# Patient Record
Sex: MALE
[De-identification: names, ages, dates, MRNs, and addresses within clinical notes are randomized per-mention and may not be internally consistent; named-entity substitution may affect disease eponyms.]

---

## 2021-07-09 ENCOUNTER — APPOINTMENT (OUTPATIENT)
Dept: UROLOGY | Facility: CLINIC | Age: 26
End: 2021-07-09
Payer: COMMERCIAL

## 2021-07-09 DIAGNOSIS — Z78.9 OTHER SPECIFIED HEALTH STATUS: ICD-10-CM

## 2021-07-09 DIAGNOSIS — N50.819 TESTICULAR PAIN, UNSPECIFIED: ICD-10-CM

## 2021-07-09 PROBLEM — Z00.00 ENCOUNTER FOR PREVENTIVE HEALTH EXAMINATION: Status: ACTIVE | Noted: 2021-07-09

## 2021-07-09 PROCEDURE — 99203 OFFICE O/P NEW LOW 30 MIN: CPT

## 2021-07-12 PROBLEM — Z78.9 NON-SMOKER: Status: ACTIVE | Noted: 2021-07-12

## 2021-07-12 PROBLEM — Z78.9 PATIENT DENIES MEDICAL PROBLEMS: Status: RESOLVED | Noted: 2021-07-12 | Resolved: 2021-07-12

## 2021-07-12 NOTE — HISTORY OF PRESENT ILLNESS
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 Was wearing cup 7/4/21. Had pain and swelling same day.\par he has had 2 ULS, as recently as yesterday noting no evidence of rupture and an intra testis hematoma. \par he feels less pain and some reduction in size.\par no prior trauma - no fertility history. \par \par reviewed the latest ULS on his phone - note the hematoma and no rupture or fluid outside testis.

## 2021-07-12 NOTE — ASSESSMENT
[FreeTextEntry1] : should resolve with conservative management - he asked about having surgery but see no indication.\par recommend better support and Aleve/ice.

## 2021-07-12 NOTE — PHYSICAL EXAM
[General Appearance - Well Developed] : well developed [General Appearance - Well Nourished] : well nourished [Edema] : no peripheral edema [Exaggerated Use Of Accessory Muscles For Inspiration] : no accessory muscle use [Abdomen Soft] : soft [Abdomen Tenderness] : non-tender [Abdomen Mass (___ Cm)] : no abdominal mass palpated [Abdomen Hernia] : no hernia was discovered [Penis Abnormality] : normal circumcised penis [Urinary Bladder Findings] : the bladder was normal on palpation [Scrotum] : the scrotum was normal [Epididymis] : the epididymides were normal [Normal Station and Gait] : the gait and station were normal for the patient's age [] : no rash [No Focal Deficits] : no focal deficits [Oriented To Time, Place, And Person] : oriented to person, place, and time [FreeTextEntry1] : the right testis enlarged but no hydrocele, bruising or edema - testis slightly tender.

## 2021-08-11 ENCOUNTER — APPOINTMENT (OUTPATIENT)
Dept: UROLOGY | Facility: CLINIC | Age: 26
End: 2021-08-11
Payer: OTHER MISCELLANEOUS

## 2021-08-11 ENCOUNTER — OUTPATIENT (OUTPATIENT)
Dept: OUTPATIENT SERVICES | Facility: HOSPITAL | Age: 26
LOS: 1 days | End: 2021-08-11
Payer: COMMERCIAL

## 2021-08-11 DIAGNOSIS — R35.0 FREQUENCY OF MICTURITION: ICD-10-CM

## 2021-08-11 DIAGNOSIS — N50.1 VASCULAR DISORDERS OF MALE GENITAL ORGANS: ICD-10-CM

## 2021-08-11 PROCEDURE — 99213 OFFICE O/P EST LOW 20 MIN: CPT

## 2021-08-11 PROCEDURE — 76870 US EXAM SCROTUM: CPT | Mod: 26

## 2021-08-11 PROCEDURE — 76870 US EXAM SCROTUM: CPT

## 2021-08-11 NOTE — HISTORY OF PRESENT ILLNESS
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 Was wearing cup 7/4/21. Had pain and swelling same day.\par he has had 2 ULS, as recently as yesterday noting no evidence of rupture and an intra testis hematoma. \par he feels less pain and some reduction in size.\par no prior trauma - no fertility history. \par \par reviewed the latest ULS on his phone - note the hematoma and no rupture or fluid outside testis. \par \par 8/21 - feels better with no pain and testis feels smaller and softer

## 2021-08-11 NOTE — ASSESSMENT
[FreeTextEntry1] : ULS noted hematoma smaller and liquifying.\par two issues we discussed: safety of returning to playing lacrosse: i say no until fully healed as would be concerned of a reinjury at this point would be devastating.\par also about atrophy and function of remaining testis - too early to sat but reassured one healthy testis sufficient for fertility and testosterone

## 2021-08-19 ENCOUNTER — NON-APPOINTMENT (OUTPATIENT)
Age: 26
End: 2021-08-19

## 2021-08-20 ENCOUNTER — NON-APPOINTMENT (OUTPATIENT)
Age: 26
End: 2021-08-20

## 2021-08-31 ENCOUNTER — OUTPATIENT (OUTPATIENT)
Dept: OUTPATIENT SERVICES | Facility: HOSPITAL | Age: 26
LOS: 1 days | End: 2021-08-31
Payer: COMMERCIAL

## 2021-08-31 ENCOUNTER — APPOINTMENT (OUTPATIENT)
Dept: UROLOGY | Facility: CLINIC | Age: 26
End: 2021-08-31

## 2021-08-31 ENCOUNTER — APPOINTMENT (OUTPATIENT)
Dept: UROLOGY | Facility: CLINIC | Age: 26
End: 2021-08-31
Payer: OTHER MISCELLANEOUS

## 2021-08-31 DIAGNOSIS — N50.1 VASCULAR DISORDERS OF MALE GENITAL ORGANS: ICD-10-CM

## 2021-08-31 DIAGNOSIS — R35.0 FREQUENCY OF MICTURITION: ICD-10-CM

## 2021-08-31 PROCEDURE — 99213 OFFICE O/P EST LOW 20 MIN: CPT

## 2021-08-31 PROCEDURE — 76870 US EXAM SCROTUM: CPT

## 2021-08-31 PROCEDURE — 76870 US EXAM SCROTUM: CPT | Mod: 26

## 2021-09-01 PROBLEM — N50.1: Status: ACTIVE | Noted: 2021-07-12

## 2021-09-01 NOTE — PHYSICAL EXAM
[General Appearance - Well Developed] : well developed [General Appearance - Well Nourished] : well nourished [Abdomen Soft] : soft [Abdomen Tenderness] : non-tender [Abdomen Hernia] : no hernia was discovered [Abdomen Mass (___ Cm)] : no abdominal mass palpated [Urethral Meatus] : meatus normal [Penis Abnormality] : normal circumcised penis [Scrotum] : the scrotum was normal [Epididymis] : the epididymides were normal [Testes Tenderness] : no tenderness of the testes [Testes Mass (___cm)] : there were no testicular masses [FreeTextEntry1] : can feel slight enlargement bottom of left testis which slightly more firm [] : no respiratory distress [Edema] : no peripheral edema [Oriented To Time, Place, And Person] : oriented to person, place, and time [Normal Station and Gait] : the gait and station were normal for the patient's age [No Focal Deficits] : no focal deficits

## 2021-09-01 NOTE — ASSESSMENT
[FreeTextEntry1] : anxious to return though will wear better protection \par Ok to return to playing without restriction \par plan to reimage 3 months

## 2021-09-01 NOTE — HISTORY OF PRESENT ILLNESS
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 Was wearing cup 7/4/21. Had pain and swelling same day.\par he has had 2 ULS, as recently as yesterday noting no evidence of rupture and an intra testis hematoma. \par he feels less pain and some reduction in size.\par no prior trauma - no fertility history. \par \par reviewed the latest ULS on his phone - note the hematoma and no rupture or fluid outside testis. 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 No pain or tenderness. feels near normal size.  detailed exam details… nose/mouth/pharynx

## 2021-09-09 DIAGNOSIS — N50.1 VASCULAR DISORDERS OF MALE GENITAL ORGANS: ICD-10-CM

## 2021-09-29 ENCOUNTER — NON-APPOINTMENT (OUTPATIENT)
Age: 26
End: 2021-09-29

## 2021-11-19 ENCOUNTER — APPOINTMENT (OUTPATIENT)
Dept: UROLOGY | Facility: CLINIC | Age: 26
End: 2021-11-19

## 2021-12-10 ENCOUNTER — APPOINTMENT (OUTPATIENT)
Dept: UROLOGY | Facility: CLINIC | Age: 26
End: 2021-12-10
